# Patient Record
Sex: MALE | Race: BLACK OR AFRICAN AMERICAN | NOT HISPANIC OR LATINO | Employment: UNEMPLOYED | URBAN - METROPOLITAN AREA
[De-identification: names, ages, dates, MRNs, and addresses within clinical notes are randomized per-mention and may not be internally consistent; named-entity substitution may affect disease eponyms.]

---

## 2023-07-12 ENCOUNTER — OFFICE VISIT (OUTPATIENT)
Dept: PRIMARY CARE | Facility: CLINIC | Age: 48
End: 2023-07-12
Payer: COMMERCIAL

## 2023-07-12 ENCOUNTER — LAB (OUTPATIENT)
Dept: LAB | Facility: LAB | Age: 48
End: 2023-07-12
Payer: COMMERCIAL

## 2023-07-12 VITALS
HEART RATE: 79 BPM | SYSTOLIC BLOOD PRESSURE: 113 MMHG | OXYGEN SATURATION: 98 % | WEIGHT: 192 LBS | DIASTOLIC BLOOD PRESSURE: 76 MMHG | BODY MASS INDEX: 30.99 KG/M2

## 2023-07-12 DIAGNOSIS — E55.9 VITAMIN D DEFICIENCY: ICD-10-CM

## 2023-07-12 DIAGNOSIS — Z12.11 SCREENING FOR COLON CANCER: ICD-10-CM

## 2023-07-12 DIAGNOSIS — R94.4 DECREASED GFR: ICD-10-CM

## 2023-07-12 DIAGNOSIS — R79.89 ELEVATED LFTS: ICD-10-CM

## 2023-07-12 DIAGNOSIS — Z00.00 WELLNESS EXAMINATION: Primary | ICD-10-CM

## 2023-07-12 PROBLEM — H53.8 BLURRED VISION: Status: ACTIVE | Noted: 2023-07-12

## 2023-07-12 PROBLEM — J45.909 ASTHMA (HHS-HCC): Status: ACTIVE | Noted: 2023-07-12

## 2023-07-12 PROBLEM — E78.00 HYPERCHOLESTEROLEMIA WITHOUT HYPERTRIGLYCERIDEMIA: Status: ACTIVE | Noted: 2023-07-12

## 2023-07-12 PROBLEM — R30.0 DYSURIA: Status: ACTIVE | Noted: 2023-07-12

## 2023-07-12 PROBLEM — T78.40XA ALLERGIC REACTION: Status: ACTIVE | Noted: 2023-07-12

## 2023-07-12 PROBLEM — R09.89 TENDER LYMPH NODE: Status: ACTIVE | Noted: 2023-07-12

## 2023-07-12 PROBLEM — N48.89 PAIN, PENILE: Status: ACTIVE | Noted: 2023-07-12

## 2023-07-12 PROBLEM — R51.9 HEADACHE: Status: ACTIVE | Noted: 2023-07-12

## 2023-07-12 PROBLEM — H61.20 WAX IN EAR: Status: ACTIVE | Noted: 2023-07-12

## 2023-07-12 PROBLEM — K20.90 ESOPHAGITIS: Status: ACTIVE | Noted: 2023-07-12

## 2023-07-12 LAB
ALANINE AMINOTRANSFERASE (SGPT) (U/L) IN SER/PLAS: 44 U/L (ref 10–52)
ALBUMIN (G/DL) IN SER/PLAS: 4.5 G/DL (ref 3.4–5)
ALKALINE PHOSPHATASE (U/L) IN SER/PLAS: 45 U/L (ref 33–120)
ANION GAP IN SER/PLAS: 10 MMOL/L (ref 10–20)
ASPARTATE AMINOTRANSFERASE (SGOT) (U/L) IN SER/PLAS: 20 U/L (ref 9–39)
BILIRUBIN TOTAL (MG/DL) IN SER/PLAS: 0.5 MG/DL (ref 0–1.2)
CALCIDIOL (25 OH VITAMIN D3) (NG/ML) IN SER/PLAS: 21 NG/ML
CALCIUM (MG/DL) IN SER/PLAS: 9.4 MG/DL (ref 8.6–10.6)
CARBON DIOXIDE, TOTAL (MMOL/L) IN SER/PLAS: 28 MMOL/L (ref 21–32)
CHLORIDE (MMOL/L) IN SER/PLAS: 106 MMOL/L (ref 98–107)
CHOLESTEROL (MG/DL) IN SER/PLAS: 194 MG/DL (ref 0–199)
CHOLESTEROL IN HDL (MG/DL) IN SER/PLAS: 52.6 MG/DL
CHOLESTEROL/HDL RATIO: 3.7
CREATININE (MG/DL) IN SER/PLAS: 1.41 MG/DL (ref 0.5–1.3)
ERYTHROCYTE DISTRIBUTION WIDTH (RATIO) BY AUTOMATED COUNT: 12.4 % (ref 11.5–14.5)
ERYTHROCYTE MEAN CORPUSCULAR HEMOGLOBIN CONCENTRATION (G/DL) BY AUTOMATED: 33.4 G/DL (ref 32–36)
ERYTHROCYTE MEAN CORPUSCULAR VOLUME (FL) BY AUTOMATED COUNT: 91 FL (ref 80–100)
ERYTHROCYTES (10*6/UL) IN BLOOD BY AUTOMATED COUNT: 4.88 X10E12/L (ref 4.5–5.9)
GFR MALE: 62 ML/MIN/1.73M2
GLUCOSE (MG/DL) IN SER/PLAS: 78 MG/DL (ref 74–99)
HEMATOCRIT (%) IN BLOOD BY AUTOMATED COUNT: 44.3 % (ref 41–52)
HEMOGLOBIN (G/DL) IN BLOOD: 14.8 G/DL (ref 13.5–17.5)
LDL: 125 MG/DL (ref 0–99)
LEUKOCYTES (10*3/UL) IN BLOOD BY AUTOMATED COUNT: 7.7 X10E9/L (ref 4.4–11.3)
NRBC (PER 100 WBCS) BY AUTOMATED COUNT: 0 /100 WBC (ref 0–0)
PLATELETS (10*3/UL) IN BLOOD AUTOMATED COUNT: 281 X10E9/L (ref 150–450)
POTASSIUM (MMOL/L) IN SER/PLAS: 4.3 MMOL/L (ref 3.5–5.3)
PROTEIN TOTAL: 7.5 G/DL (ref 6.4–8.2)
SODIUM (MMOL/L) IN SER/PLAS: 140 MMOL/L (ref 136–145)
THYROTROPIN (MIU/L) IN SER/PLAS BY DETECTION LIMIT <= 0.05 MIU/L: 1.34 MIU/L (ref 0.44–3.98)
TRIGLYCERIDE (MG/DL) IN SER/PLAS: 84 MG/DL (ref 0–149)
UREA NITROGEN (MG/DL) IN SER/PLAS: 13 MG/DL (ref 6–23)
VLDL: 17 MG/DL (ref 0–40)

## 2023-07-12 PROCEDURE — 80053 COMPREHEN METABOLIC PANEL: CPT

## 2023-07-12 PROCEDURE — 1036F TOBACCO NON-USER: CPT | Performed by: STUDENT IN AN ORGANIZED HEALTH CARE EDUCATION/TRAINING PROGRAM

## 2023-07-12 PROCEDURE — 82306 VITAMIN D 25 HYDROXY: CPT

## 2023-07-12 PROCEDURE — 84443 ASSAY THYROID STIM HORMONE: CPT

## 2023-07-12 PROCEDURE — 36415 COLL VENOUS BLD VENIPUNCTURE: CPT

## 2023-07-12 PROCEDURE — 99396 PREV VISIT EST AGE 40-64: CPT | Performed by: STUDENT IN AN ORGANIZED HEALTH CARE EDUCATION/TRAINING PROGRAM

## 2023-07-12 PROCEDURE — 85027 COMPLETE CBC AUTOMATED: CPT

## 2023-07-12 PROCEDURE — 80061 LIPID PANEL: CPT

## 2023-07-12 RX ORDER — ALBUTEROL SULFATE 90 UG/1
1 AEROSOL, METERED RESPIRATORY (INHALATION) EVERY 4 HOURS PRN
COMMUNITY

## 2023-07-12 NOTE — PROGRESS NOTES
HPI: 47-year-old male presenting for annual exam.  New concerns.  Is planning to start exercise regimen soon, wants to focus on weight lifting.  Wants to see eye doctor, no current complaints, just wants checkup.    Intermittent asthma  Uses albuterol approximately twice a year.  Doing relatively well without    Fatty liver  Transient elevation in ALT, ultrasound showed mild fatty infiltration.    HLD  Elevated LDL at 154, corresponding ASCVD risk low.  Asymptomatic.    SocHx:   - Smoking: Never   - Alcohol: Rare   - Drug use: None    Denies HA, changes in vision, chest pain, SOB/SANDS, palpitations, N/V/D/C, dysuria/hematuria, hematochezia/melena, paresthesias, LE edema.    12 point ROS reviewed and negative other than as stated in HPI      General: Alert, oriented, pleasant, in no acute distress  HEENT:      Head: normocephalic, atraumatic;      eyes: EOMI, no scleral icterus;   Neck: soft, supple, non-tender, no masses appreciated  CV: Heart with regular rate and rhythm, normal S1/S2, no murmurs  Lungs: CTAB without wheezing, rhonchi or rales; good respiratory effort, no increased work of breathing  Abdomen: soft, non-tender, non-distended, no masses appreciated  Extremities: no edema, no cyanosis  MSK: ROM of upper and lower extremities intact; strength 5/5 upper and lower extremities  Neuro: Cranial nerves grossly intact; alert and oriented, normal gait  Psych: Appropriate mood and affect    1. HM  -CBC, CMP, Lipid panel, Vit D, TSH with reflex T4  -Vaccines:       Flu: out of season      Shingrix: at 50      Pneumococcal: Prevnar 20      Tdap: Likely 2017, will give in 20227  -Lung cancer screening with low-dose CT: never smoker  -Colonoscopy: Ordered  -AAA screening: Never smoker  -Ophthalmology referral at patient's request    2. Intermittent asthma  -Continue with albuterol as needed, using less than several times a year    3.  NAFLD  - Transient elevated ALT, ultrasound showed mild infiltration  - Continue  healthy diet, body fat reduction    4.  HLD  - LDL at 154, HDL 47.9, ASCVD risk at 3.8%  - Repeat lipid panel    F/U annually or sooner if indicated    Chris D'Amico, DO HPI:

## 2023-07-13 NOTE — RESULT ENCOUNTER NOTE
Borderline elevated LDL at 125, good HDL at 52.6, corresponding ASCVD risk is low at 3.6%, no medication indicated.  Continue to improve diet And exercise.    Chronic kidney disease double stage, slightly worsened over the last year or 2.  Unclear etiology.  We will continue to monitor.  Could consider Farxiga in the near future.  If showing any more decline, given young age, would consider nephrology referral.    Vitamin D mildly decreased at 21, would recommend OTC vitamin D3, 2000 units daily.    Remaining labs unremarkable.

## 2023-09-12 ENCOUNTER — TELEPHONE (OUTPATIENT)
Dept: PRIMARY CARE | Facility: CLINIC | Age: 48
End: 2023-09-12
Payer: COMMERCIAL

## 2023-11-01 ENCOUNTER — LAB (OUTPATIENT)
Dept: LAB | Facility: LAB | Age: 48
End: 2023-11-01
Payer: COMMERCIAL

## 2023-11-01 DIAGNOSIS — E78.00 ELEVATED LDL CHOLESTEROL LEVEL: ICD-10-CM

## 2023-11-01 DIAGNOSIS — E55.9 VITAMIN D DEFICIENCY: ICD-10-CM

## 2023-11-01 DIAGNOSIS — E55.9 VITAMIN D DEFICIENCY: Primary | ICD-10-CM

## 2023-11-01 PROCEDURE — 82306 VITAMIN D 25 HYDROXY: CPT

## 2023-11-01 PROCEDURE — 80061 LIPID PANEL: CPT

## 2023-11-01 PROCEDURE — 36415 COLL VENOUS BLD VENIPUNCTURE: CPT

## 2023-11-01 NOTE — LETTER
November 3, 2023     Mumtaz Gary  [address]  [place] [address]      Dear Mr. Vega:    Below are the results from your recent visit:        Vitamin D is slightly better at 29, would recommend taking daily OTC vitamin D3, 2000 units    Cholesterol actually worsened somewhat from 3 months ago, LDL at 153, ASCVD 10-year risk is still low at 4%, would continue to work on diet.  We can refer to nutritionist if patient is interested.  We generally recommend the Mediterranean diet.         If you have any questions or concerns, please don't hesitate to call.

## 2023-11-02 LAB
25(OH)D3 SERPL-MCNC: 29 NG/ML (ref 30–100)
CHOLEST SERPL-MCNC: 218 MG/DL (ref 0–199)
CHOLESTEROL/HDL RATIO: 4.3
HDLC SERPL-MCNC: 50.4 MG/DL
LDLC SERPL CALC-MCNC: 153 MG/DL
NON HDL CHOLESTEROL: 168 MG/DL (ref 0–149)
TRIGL SERPL-MCNC: 71 MG/DL (ref 0–149)
VLDL: 14 MG/DL (ref 0–40)

## 2023-11-02 NOTE — RESULT ENCOUNTER NOTE
Vitamin D is slightly better at 29, would recommend taking daily OTC vitamin D3, 2000 units    Cholesterol actually worsened somewhat from 3 months ago, LDL at 153, ASCVD 10-year risk is still low at 4%, would continue to work on diet.  We can refer to nutritionist if patient is interested.  We generally recommend the Mediterranean diet.

## 2023-11-07 DIAGNOSIS — E78.00 ELEVATED LDL CHOLESTEROL LEVEL: Primary | ICD-10-CM

## 2023-11-07 PROBLEM — D49.2 NEOPLASM OF UNSPECIFIED BEHAVIOR OF BONE, SOFT TISSUE, AND SKIN: Status: ACTIVE | Noted: 2018-07-05

## 2023-11-07 PROBLEM — B07.8 OTHER VIRAL WARTS: Status: ACTIVE | Noted: 2018-07-05

## 2023-11-07 PROBLEM — D04.61 CARCINOMA IN SITU OF SKIN OF RIGHT UPPER LIMB, INCLUDING SHOULDER: Status: ACTIVE | Noted: 2018-07-05

## 2023-11-07 PROBLEM — R00.2 PALPITATIONS: Status: ACTIVE | Noted: 2018-01-26

## 2023-11-07 PROBLEM — D22.5 MELANOCYTIC NEVI OF TRUNK: Status: ACTIVE | Noted: 2018-07-05

## 2023-11-07 PROBLEM — R69 DIAGNOSIS UNKNOWN: Status: ACTIVE | Noted: 2023-11-07

## 2023-11-07 PROBLEM — E66.3 OVERWEIGHT: Status: ACTIVE | Noted: 2018-01-26

## 2023-11-14 ENCOUNTER — APPOINTMENT (OUTPATIENT)
Dept: OPHTHALMOLOGY | Facility: CLINIC | Age: 48
End: 2023-11-14
Payer: COMMERCIAL

## 2024-04-14 ENCOUNTER — APPOINTMENT (OUTPATIENT)
Dept: RADIOLOGY | Facility: HOSPITAL | Age: 49
End: 2024-04-14
Payer: COMMERCIAL

## 2024-04-14 ENCOUNTER — APPOINTMENT (OUTPATIENT)
Dept: CARDIOLOGY | Facility: HOSPITAL | Age: 49
End: 2024-04-14
Payer: COMMERCIAL

## 2024-04-14 ENCOUNTER — HOSPITAL ENCOUNTER (EMERGENCY)
Facility: HOSPITAL | Age: 49
Discharge: HOME | End: 2024-04-14
Attending: STUDENT IN AN ORGANIZED HEALTH CARE EDUCATION/TRAINING PROGRAM
Payer: COMMERCIAL

## 2024-04-14 ENCOUNTER — ANESTHESIA EVENT (OUTPATIENT)
Dept: GASTROENTEROLOGY | Facility: HOSPITAL | Age: 49
End: 2024-04-14
Payer: COMMERCIAL

## 2024-04-14 VITALS
WEIGHT: 193 LBS | OXYGEN SATURATION: 96 % | HEIGHT: 67 IN | BODY MASS INDEX: 30.29 KG/M2 | RESPIRATION RATE: 18 BRPM | DIASTOLIC BLOOD PRESSURE: 87 MMHG | SYSTOLIC BLOOD PRESSURE: 132 MMHG | TEMPERATURE: 97.9 F | HEART RATE: 79 BPM

## 2024-04-14 DIAGNOSIS — T18.128A ESOPHAGEAL OBSTRUCTION DUE TO FOOD IMPACTION: Primary | ICD-10-CM
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
## 2024-04-14 DIAGNOSIS — R13.10 DYSPHAGIA, UNSPECIFIED TYPE: ICD-10-CM

## 2024-04-14 DIAGNOSIS — T18.128A FOOD IMPACTION OF ESOPHAGUS, INITIAL ENCOUNTER: ICD-10-CM

## 2024-04-14 LAB
ALBUMIN SERPL BCP-MCNC: 4.3 G/DL (ref 3.4–5)
ALP SERPL-CCNC: 43 U/L (ref 33–120)
ALT SERPL W P-5'-P-CCNC: 36 U/L (ref 10–52)
ANION GAP SERPL CALC-SCNC: 12 MMOL/L (ref 10–20)
AST SERPL W P-5'-P-CCNC: 21 U/L (ref 9–39)
BASOPHILS # BLD AUTO: 0.06 X10*3/UL (ref 0–0.1)
BASOPHILS NFR BLD AUTO: 0.9 %
BILIRUB SERPL-MCNC: 0.5 MG/DL (ref 0–1.2)
BUN SERPL-MCNC: 14 MG/DL (ref 6–23)
CALCIUM SERPL-MCNC: 8.9 MG/DL (ref 8.6–10.3)
CARDIAC TROPONIN I PNL SERPL HS: <3 NG/L (ref 0–20)
CHLORIDE SERPL-SCNC: 108 MMOL/L (ref 98–107)
CO2 SERPL-SCNC: 24 MMOL/L (ref 21–32)
CREAT SERPL-MCNC: 1.33 MG/DL (ref 0.5–1.3)
EGFRCR SERPLBLD CKD-EPI 2021: 66 ML/MIN/1.73M*2
EOSINOPHIL # BLD AUTO: 0.36 X10*3/UL (ref 0–0.7)
EOSINOPHIL NFR BLD AUTO: 5.3 %
ERYTHROCYTE [DISTWIDTH] IN BLOOD BY AUTOMATED COUNT: 12.3 % (ref 11.5–14.5)
GLUCOSE SERPL-MCNC: 88 MG/DL (ref 74–99)
HCT VFR BLD AUTO: 41.3 % (ref 41–52)
HGB BLD-MCNC: 14.2 G/DL (ref 13.5–17.5)
IMM GRANULOCYTES # BLD AUTO: 0.04 X10*3/UL (ref 0–0.7)
IMM GRANULOCYTES NFR BLD AUTO: 0.6 % (ref 0–0.9)
LYMPHOCYTES # BLD AUTO: 2.1 X10*3/UL (ref 1.2–4.8)
LYMPHOCYTES NFR BLD AUTO: 30.7 %
MCH RBC QN AUTO: 30.7 PG (ref 26–34)
MCHC RBC AUTO-ENTMCNC: 34.4 G/DL (ref 32–36)
MCV RBC AUTO: 89 FL (ref 80–100)
MONOCYTES # BLD AUTO: 0.48 X10*3/UL (ref 0.1–1)
MONOCYTES NFR BLD AUTO: 7 %
NEUTROPHILS # BLD AUTO: 3.79 X10*3/UL (ref 1.2–7.7)
NEUTROPHILS NFR BLD AUTO: 55.5 %
NRBC BLD-RTO: 0 /100 WBCS (ref 0–0)
PLATELET # BLD AUTO: 267 X10*3/UL (ref 150–450)
POTASSIUM SERPL-SCNC: 3.7 MMOL/L (ref 3.5–5.3)
PROT SERPL-MCNC: 7.1 G/DL (ref 6.4–8.2)
RBC # BLD AUTO: 4.62 X10*6/UL (ref 4.5–5.9)
SODIUM SERPL-SCNC: 140 MMOL/L (ref 136–145)
WBC # BLD AUTO: 6.8 X10*3/UL (ref 4.4–11.3)

## 2024-04-14 PROCEDURE — 71045 X-RAY EXAM CHEST 1 VIEW: CPT

## 2024-04-14 PROCEDURE — 36415 COLL VENOUS BLD VENIPUNCTURE: CPT | Performed by: STUDENT IN AN ORGANIZED HEALTH CARE EDUCATION/TRAINING PROGRAM

## 2024-04-14 PROCEDURE — 71045 X-RAY EXAM CHEST 1 VIEW: CPT | Performed by: RADIOLOGY

## 2024-04-14 PROCEDURE — 2500000004 HC RX 250 GENERAL PHARMACY W/ HCPCS (ALT 636 FOR OP/ED): Mod: JZ | Performed by: STUDENT IN AN ORGANIZED HEALTH CARE EDUCATION/TRAINING PROGRAM

## 2024-04-14 PROCEDURE — 93005 ELECTROCARDIOGRAM TRACING: CPT | Mod: 59

## 2024-04-14 PROCEDURE — 2500000004 HC RX 250 GENERAL PHARMACY W/ HCPCS (ALT 636 FOR OP/ED): Performed by: INTERNAL MEDICINE

## 2024-04-14 PROCEDURE — 3700000001 HC GENERAL ANESTHESIA TIME - INITIAL BASE CHARGE

## 2024-04-14 PROCEDURE — 99222 1ST HOSP IP/OBS MODERATE 55: CPT | Performed by: INTERNAL MEDICINE

## 2024-04-14 PROCEDURE — 3700000002 HC GENERAL ANESTHESIA TIME - EACH INCREMENTAL 1 MINUTE

## 2024-04-14 PROCEDURE — A43239 PR EDG TRANSORAL BIOPSY SINGLE/MULTIPLE: Performed by: ANESTHESIOLOGY

## 2024-04-14 PROCEDURE — 2500000005 HC RX 250 GENERAL PHARMACY W/O HCPCS

## 2024-04-14 PROCEDURE — 99285 EMERGENCY DEPT VISIT HI MDM: CPT | Mod: 25 | Performed by: STUDENT IN AN ORGANIZED HEALTH CARE EDUCATION/TRAINING PROGRAM

## 2024-04-14 PROCEDURE — 84484 ASSAY OF TROPONIN QUANT: CPT | Performed by: STUDENT IN AN ORGANIZED HEALTH CARE EDUCATION/TRAINING PROGRAM

## 2024-04-14 PROCEDURE — 85025 COMPLETE CBC W/AUTO DIFF WBC: CPT | Performed by: STUDENT IN AN ORGANIZED HEALTH CARE EDUCATION/TRAINING PROGRAM

## 2024-04-14 PROCEDURE — 96374 THER/PROPH/DIAG INJ IV PUSH: CPT | Mod: 59 | Performed by: STUDENT IN AN ORGANIZED HEALTH CARE EDUCATION/TRAINING PROGRAM

## 2024-04-14 PROCEDURE — A43239 PR EDG TRANSORAL BIOPSY SINGLE/MULTIPLE

## 2024-04-14 PROCEDURE — 99140 ANES COMP EMERGENCY COND: CPT | Performed by: ANESTHESIOLOGY

## 2024-04-14 PROCEDURE — 80053 COMPREHEN METABOLIC PANEL: CPT | Performed by: STUDENT IN AN ORGANIZED HEALTH CARE EDUCATION/TRAINING PROGRAM

## 2024-04-14 PROCEDURE — 2500000004 HC RX 250 GENERAL PHARMACY W/ HCPCS (ALT 636 FOR OP/ED)

## 2024-04-14 PROCEDURE — 43249 ESOPH EGD DILATION <30 MM: CPT

## 2024-04-14 RX ORDER — PROPOFOL 10 MG/ML
INJECTION, EMULSION INTRAVENOUS AS NEEDED
Status: DISCONTINUED | OUTPATIENT
Start: 2024-04-14 | End: 2024-04-14

## 2024-04-14 RX ORDER — LIDOCAINE HYDROCHLORIDE 10 MG/ML
0.1 INJECTION INFILTRATION; PERINEURAL ONCE
Status: DISCONTINUED | OUTPATIENT
Start: 2024-04-14 | End: 2024-04-14 | Stop reason: HOSPADM

## 2024-04-14 RX ORDER — SUCCINYLCHOLINE CHLORIDE 20 MG/ML
INJECTION INTRAMUSCULAR; INTRAVENOUS AS NEEDED
Status: DISCONTINUED | OUTPATIENT
Start: 2024-04-14 | End: 2024-04-14

## 2024-04-14 RX ORDER — ACETAMINOPHEN 325 MG/1
650 TABLET ORAL EVERY 4 HOURS PRN
Status: DISCONTINUED | OUTPATIENT
Start: 2024-04-14 | End: 2024-04-14 | Stop reason: HOSPADM

## 2024-04-14 RX ORDER — DROPERIDOL 2.5 MG/ML
0.62 INJECTION, SOLUTION INTRAMUSCULAR; INTRAVENOUS ONCE AS NEEDED
Status: DISCONTINUED | OUTPATIENT
Start: 2024-04-14 | End: 2024-04-14 | Stop reason: HOSPADM

## 2024-04-14 RX ORDER — ONDANSETRON HYDROCHLORIDE 2 MG/ML
INJECTION, SOLUTION INTRAVENOUS AS NEEDED
Status: DISCONTINUED | OUTPATIENT
Start: 2024-04-14 | End: 2024-04-14

## 2024-04-14 RX ORDER — IPRATROPIUM BROMIDE 0.5 MG/2.5ML
500 SOLUTION RESPIRATORY (INHALATION) ONCE
Status: DISCONTINUED | OUTPATIENT
Start: 2024-04-14 | End: 2024-04-14 | Stop reason: HOSPADM

## 2024-04-14 RX ORDER — ONDANSETRON HYDROCHLORIDE 2 MG/ML
4 INJECTION, SOLUTION INTRAVENOUS ONCE AS NEEDED
Status: DISCONTINUED | OUTPATIENT
Start: 2024-04-14 | End: 2024-04-14 | Stop reason: HOSPADM

## 2024-04-14 RX ORDER — FENTANYL CITRATE 50 UG/ML
INJECTION, SOLUTION INTRAMUSCULAR; INTRAVENOUS AS NEEDED
Status: DISCONTINUED | OUTPATIENT
Start: 2024-04-14 | End: 2024-04-14

## 2024-04-14 RX ORDER — ALBUTEROL SULFATE 0.83 MG/ML
2.5 SOLUTION RESPIRATORY (INHALATION) ONCE AS NEEDED
Status: DISCONTINUED | OUTPATIENT
Start: 2024-04-14 | End: 2024-04-14 | Stop reason: HOSPADM

## 2024-04-14 RX ORDER — SODIUM CHLORIDE, SODIUM LACTATE, POTASSIUM CHLORIDE, CALCIUM CHLORIDE 600; 310; 30; 20 MG/100ML; MG/100ML; MG/100ML; MG/100ML
20 INJECTION, SOLUTION INTRAVENOUS CONTINUOUS
Status: DISCONTINUED | OUTPATIENT
Start: 2024-04-14 | End: 2024-04-14 | Stop reason: HOSPADM

## 2024-04-14 RX ORDER — MIDAZOLAM HYDROCHLORIDE 1 MG/ML
INJECTION INTRAMUSCULAR; INTRAVENOUS AS NEEDED
Status: DISCONTINUED | OUTPATIENT
Start: 2024-04-14 | End: 2024-04-14

## 2024-04-14 RX ORDER — OXYCODONE HYDROCHLORIDE 5 MG/1
5 TABLET ORAL EVERY 4 HOURS PRN
Status: DISCONTINUED | OUTPATIENT
Start: 2024-04-14 | End: 2024-04-14 | Stop reason: HOSPADM

## 2024-04-14 RX ORDER — SODIUM CHLORIDE, SODIUM LACTATE, POTASSIUM CHLORIDE, CALCIUM CHLORIDE 600; 310; 30; 20 MG/100ML; MG/100ML; MG/100ML; MG/100ML
100 INJECTION, SOLUTION INTRAVENOUS CONTINUOUS
Status: DISCONTINUED | OUTPATIENT
Start: 2024-04-14 | End: 2024-04-14 | Stop reason: HOSPADM

## 2024-04-14 RX ORDER — LIDOCAINE HYDROCHLORIDE 20 MG/ML
INJECTION, SOLUTION EPIDURAL; INFILTRATION; INTRACAUDAL; PERINEURAL AS NEEDED
Status: DISCONTINUED | OUTPATIENT
Start: 2024-04-14 | End: 2024-04-14

## 2024-04-14 RX ADMIN — FENTANYL CITRATE 50 MCG: 50 INJECTION, SOLUTION INTRAMUSCULAR; INTRAVENOUS at 18:45

## 2024-04-14 RX ADMIN — GLUCAGON HYDROCHLORIDE 1 MG: 1 INJECTION, POWDER, FOR SOLUTION INTRAMUSCULAR; INTRAVENOUS; SUBCUTANEOUS at 11:30

## 2024-04-14 RX ADMIN — SUCCINYLCHOLINE CHLORIDE 160 MG: 20 INJECTION, SOLUTION INTRAMUSCULAR; INTRAVENOUS at 18:22

## 2024-04-14 RX ADMIN — SODIUM CHLORIDE, POTASSIUM CHLORIDE, SODIUM LACTATE AND CALCIUM CHLORIDE: 600; 310; 30; 20 INJECTION, SOLUTION INTRAVENOUS at 18:18

## 2024-04-14 RX ADMIN — FENTANYL CITRATE 100 MCG: 50 INJECTION, SOLUTION INTRAMUSCULAR; INTRAVENOUS at 18:22

## 2024-04-14 RX ADMIN — LIDOCAINE HYDROCHLORIDE 100 MG: 20 INJECTION, SOLUTION EPIDURAL; INFILTRATION; INTRACAUDAL; PERINEURAL at 18:22

## 2024-04-14 RX ADMIN — FENTANYL CITRATE 50 MCG: 50 INJECTION, SOLUTION INTRAMUSCULAR; INTRAVENOUS at 18:35

## 2024-04-14 RX ADMIN — MIDAZOLAM HYDROCHLORIDE 2 MG: 1 INJECTION INTRAMUSCULAR; INTRAVENOUS at 18:21

## 2024-04-14 RX ADMIN — DEXAMETHASONE SODIUM PHOSPHATE 8 MG: 4 INJECTION, SOLUTION INTRAMUSCULAR; INTRAVENOUS at 18:30

## 2024-04-14 RX ADMIN — PROPOFOL 200 MG: 10 INJECTION, EMULSION INTRAVENOUS at 18:22

## 2024-04-14 RX ADMIN — ONDANSETRON 4 MG: 2 INJECTION INTRAMUSCULAR; INTRAVENOUS at 18:50

## 2024-04-14 ASSESSMENT — COLUMBIA-SUICIDE SEVERITY RATING SCALE - C-SSRS
1. IN THE PAST MONTH, HAVE YOU WISHED YOU WERE DEAD OR WISHED YOU COULD GO TO SLEEP AND NOT WAKE UP?: NO
6. HAVE YOU EVER DONE ANYTHING, STARTED TO DO ANYTHING, OR PREPARED TO DO ANYTHING TO END YOUR LIFE?: NO
2. HAVE YOU ACTUALLY HAD ANY THOUGHTS OF KILLING YOURSELF?: NO

## 2024-04-14 ASSESSMENT — PAIN - FUNCTIONAL ASSESSMENT
PAIN_FUNCTIONAL_ASSESSMENT: 0-10
PAIN_FUNCTIONAL_ASSESSMENT: 0-10
PAIN_FUNCTIONAL_ASSESSMENT: UNABLE TO SELF-REPORT
PAIN_FUNCTIONAL_ASSESSMENT: 0-10

## 2024-04-14 ASSESSMENT — PAIN SCALES - GENERAL
PAINLEVEL_OUTOF10: 0 - NO PAIN

## 2024-04-14 ASSESSMENT — ENCOUNTER SYMPTOMS: VOMITING: 1

## 2024-04-14 NOTE — ANESTHESIA PROCEDURE NOTES
Airway  Date/Time: 4/14/2024 6:24 PM  Urgency: elective    Airway not difficult    Staffing  Performed: YASEMIN   Authorized by: Fuentes Smiley MD    Performed by: YASEMIN Pedro  Patient location during procedure: OR    Indications and Patient Condition  Indications for airway management: anesthesia  Spontaneous Ventilation: absent  Sedation level: deep  Preoxygenated: yes  Patient position: sniffing  Mask difficulty assessment: 1 - vent by mask  No planned trial extubation    Final Airway Details  Final airway type: endotracheal airway      Successful airway: ETT  Cuffed: yes   Successful intubation technique: direct laryngoscopy  Endotracheal tube insertion site: oral  Blade: Kelsey  Blade size: #4  ETT size (mm): 7.5  Cormack-Lehane Classification: grade I - full view of glottis  Placement verified by: chest auscultation and capnometry   Cuff volume (mL): 7  Measured from: lips  ETT to lips (cm): 22  Number of attempts at approach: 1

## 2024-04-14 NOTE — H&P (VIEW-ONLY)
Gastroenterology Consultation Note    ASSESSMENT and PLAN:     Mumtaz Vega is a 48 y.o. male with a past medical history of asthma who presented with food impaction.      Proceed with urgent EGD this evening to remove the food bolus. Assuming no issues or complications, the patient should be able to go home afterwards.     Cecilio Brown MD    HISTORY OF PRESENT ILLNESS:     History Of Present Illness:    Mumtaz Vega is a 48 y.o. male who was eating a corned beef sandwich when he felt something get stuck in his esophagus. He has been unable to tolerate any PO intake or his own secretions since that time. He presented to ED where he received glucagon without improvement. He has had issues with prior food impaction with EGD to disimpact in 2021 as noted below. No blood thinners.     Relevant endoscopic evaluation results were personally reviewed.  EGD (7/2021)  - Normal duodenal bulb, first portion of the duodenum,                          second portion of the duodenum and examined duodenum.                         - Normal stomach.                         - Food in the middle third of the esophagus and in the                          lower third of the esophagus. Removal was successful.    Review of systems:   Review of Systems   Gastrointestinal:  Positive for vomiting.   All other systems reviewed and are negative.     A 10+ point ROS was completed and otherwise negative except as noted and per HPI.    PAST HISTORIES:     Past Medical History:  Past Medical History:   Diagnosis Date    Personal history of diseases of the skin and subcutaneous tissue 09/19/2018    History of eczema       Past Surgical History:  No past surgical history on file.     Family History:  Family History   Problem Relation Name Age of Onset    COPD Mother      Diabetes Mother      Glaucoma Mother      Hypertension Mother          Social History:   reports that he has never smoked. He has never used smokeless tobacco. He reports that he  "does not drink alcohol. No history on file for drug use.    OBJECTIVE:     Last Recorded Vitals:  Vitals:    04/14/24 1245 04/14/24 1330 04/14/24 1530 04/14/24 1615   BP: (!) 129/96 121/84 (!) 127/91 129/87   Pulse: 74 68 68 68   Resp: 14 18 18 20   Temp:       SpO2: 97% 100% 98% 98%   Weight:   87.5 kg (193 lb)    Height:   1.689 m (5' 6.5\")        Physical Exam:  CONSTITUTIONAL: NAD, appears stated age  EYES: anicteric sclera, sclera clear  HEAD: normocephalic, atraumatic   NECK: supple   ABDOMEN: soft, NTND, no rebound or guarding   MUSCULOSKELETAL: no edema  SKIN: no jaundice   PSYCHIATRIC: AOx3, appropriate insight and judgement    Allergies:  Allergies   Allergen Reactions    Amoxicillin Unknown       Inpatient Medications:          Outpatient Medications:  Prior to Admission medications    Medication Sig Start Date End Date Taking? Authorizing Provider   albuterol 90 mcg/actuation inhaler Inhale 1 puff every 4 hours if needed.    Historical Provider, MD       LABS AND IMAGING:     Labs:  Results for orders placed or performed during the hospital encounter of 04/14/24 (from the past 24 hour(s))   CBC and Auto Differential   Result Value Ref Range    WBC 6.8 4.4 - 11.3 x10*3/uL    nRBC 0.0 0.0 - 0.0 /100 WBCs    RBC 4.62 4.50 - 5.90 x10*6/uL    Hemoglobin 14.2 13.5 - 17.5 g/dL    Hematocrit 41.3 41.0 - 52.0 %    MCV 89 80 - 100 fL    MCH 30.7 26.0 - 34.0 pg    MCHC 34.4 32.0 - 36.0 g/dL    RDW 12.3 11.5 - 14.5 %    Platelets 267 150 - 450 x10*3/uL    Neutrophils % 55.5 40.0 - 80.0 %    Immature Granulocytes %, Automated 0.6 0.0 - 0.9 %    Lymphocytes % 30.7 13.0 - 44.0 %    Monocytes % 7.0 2.0 - 10.0 %    Eosinophils % 5.3 0.0 - 6.0 %    Basophils % 0.9 0.0 - 2.0 %    Neutrophils Absolute 3.79 1.20 - 7.70 x10*3/uL    Immature Granulocytes Absolute, Automated 0.04 0.00 - 0.70 x10*3/uL    Lymphocytes Absolute 2.10 1.20 - 4.80 x10*3/uL    Monocytes Absolute 0.48 0.10 - 1.00 x10*3/uL    Eosinophils Absolute 0.36 " 0.00 - 0.70 x10*3/uL    Basophils Absolute 0.06 0.00 - 0.10 x10*3/uL   Comprehensive metabolic panel   Result Value Ref Range    Glucose 88 74 - 99 mg/dL    Sodium 140 136 - 145 mmol/L    Potassium 3.7 3.5 - 5.3 mmol/L    Chloride 108 (H) 98 - 107 mmol/L    Bicarbonate 24 21 - 32 mmol/L    Anion Gap 12 10 - 20 mmol/L    Urea Nitrogen 14 6 - 23 mg/dL    Creatinine 1.33 (H) 0.50 - 1.30 mg/dL    eGFR 66 >60 mL/min/1.73m*2    Calcium 8.9 8.6 - 10.3 mg/dL    Albumin 4.3 3.4 - 5.0 g/dL    Alkaline Phosphatase 43 33 - 120 U/L    Total Protein 7.1 6.4 - 8.2 g/dL    AST 21 9 - 39 U/L    Bilirubin, Total 0.5 0.0 - 1.2 mg/dL    ALT 36 10 - 52 U/L   Troponin I, High Sensitivity   Result Value Ref Range    Troponin I, High Sensitivity <3 0 - 20 ng/L        Relevant imaging results were personally reviewed.  CXR  No acute cardiopulmonary process is evident.

## 2024-04-14 NOTE — ANESTHESIA POSTPROCEDURE EVALUATION
Patient: Mumtaz Vega    Procedure Summary       Date: 04/14/24 Room / Location: [facility] [place]    Anesthesia Start: 1818 Anesthesia Stop: 1904    Procedure: EGD Diagnosis: Food impaction of esophagus, initial encounter    Scheduled Providers:  Responsible Provider: Fuentes Smiley MD    Anesthesia Type: general ASA Status: 2 - Emergent            Anesthesia Type: general    Vitals Value Taken Time   BP  04/14/24 1909   Temp  04/14/24 1909   Pulse  04/14/24 1909   Resp  04/14/24 1909   SpO2  04/14/24 1909       Anesthesia Post Evaluation    Patient location during evaluation: bedside  Patient participation: complete - patient participated  Level of consciousness: awake  Pain management: adequate  Airway patency: patent  Cardiovascular status: acceptable  Respiratory status: acceptable  Hydration status: acceptable  Postoperative Nausea and Vomiting: none        No notable events documented.

## 2024-04-14 NOTE — DISCHARGE INSTRUCTIONS

## 2024-04-14 NOTE — ED PROVIDER NOTES
EMERGENCY MEDICINE EVALUATION NOTE    History of Present Illness     Chief Complaint:   Chief Complaint   Patient presents with    Foreign Body     Food bolus       HPI: Mumtaz Vega is a 48 y.o. male with past medical history of eczema who presents with complaint of foreign body sensation.  Patient states he ate a corn beef sandwich yesterday evening.  He states he has had similar issues in the past and states shortly afterwards he felt like the food was stuck in his esophagus.  He states since this morning he has been unable to swallow his own saliva and secretions and with immediate spit up.  He also states he has tried soda as well as other beverages with immediate regurgitation.  He does admit some pain and discomfort in his upper throat.  He denies any fever, chills, abdominal pain, nausea or vomiting.  Patient states he has had issues like this in the past usually after [place] sandwiches and states he has followed up with gastroenterology and did receive an endoscopy several years ago for this issue.      Previous History     Past Medical History:   Diagnosis Date    Personal history of diseases of the skin and subcutaneous tissue 09/19/2018    History of eczema     No past surgical history on file.  Social History     Tobacco Use    Smoking status: Never    Smokeless tobacco: Never   Vaping Use    Vaping status: Never Used   Substance Use Topics    Alcohol use: Never     Family History   Problem Relation Name Age of Onset    COPD Mother      Diabetes Mother      Glaucoma Mother      Hypertension Mother       Allergies   Allergen Reactions    Amoxicillin Unknown     Current Outpatient Medications   Medication Instructions    albuterol 90 mcg/actuation inhaler 1 puff, inhalation, Every 4 hours PRN       Physical Exam     Appearance: Alert, oriented , cooperative,  in acute distress. Well nourished & well hydrated.     Skin: Intact,  dry skin, no lesions, rash, petechiae or purpura.      Eyes: PERRLA, EOMs  intact,  Conjunctiva pink with no redness or exudates. Cornea & anterior chamber are clear, Eyelids without lesions. No scleral icterus.      ENT: Hearing grossly intact. External auditory canals patent, tympanic membranes intact with visible landmarks. Nares patent, mucus membranes moist. Dentition without lesions. Pharynx clear, uvula midline.      Neck: Supple, without meningismus. Thyroid not palpable. Trachea at midline. No lymphadenopathy.     Pulmonary: Clear bilaterally with good chest wall excursion. No rales, rhonchi or wheezing. No accessory muscle use or stridor.     Cardiac: Normal S1, S2 without murmur, rub, gallop or extrasystole. No JVD, Carotids without bruits.     Abdomen: Soft, nontender, active bowel sounds.  No palpable organomegaly.  No rebound or guarding.  No CVA tenderness.     Genitourinary: Exam deferred.     Musculoskeletal: Full range of motion. no pain, edema, or deformity. Pulses full and equal. No cyanosis or clubbing.      Neurological:  Cranial nerves II through XII are grossly intact, finger-nose touch is normal, normal sensation, no weakness, no focal findings identified.     Psychiatric: Appropriate mood and affect.      Results     Labs Reviewed   COMPREHENSIVE METABOLIC PANEL - Abnormal       Result Value    Glucose 88      Sodium 140      Potassium 3.7      Chloride 108 (*)     Bicarbonate 24      Anion Gap 12      Urea Nitrogen 14      Creatinine 1.33 (*)     eGFR 66      Calcium 8.9      Albumin 4.3      Alkaline Phosphatase 43      Total Protein 7.1      AST 21      Bilirubin, Total 0.5      ALT 36     TROPONIN I, HIGH SENSITIVITY - Normal    Troponin I, High Sensitivity <3      Narrative:     Less than 99th percentile of normal range cutoff-  Female and children under 18 years old <14 ng/L; Male <21 ng/L: Negative  Repeat testing should be performed if clinically indicated.     Female and children under 18 years old 14-50 ng/L; Male 21-50 ng/L:  Consistent with possible  cardiac damage and possible increased clinical   risk. Serial measurements may help to assess extent of myocardial damage.     >50 ng/L: Consistent with cardiac damage, increased clinical risk and  myocardial infarction. Serial measurements may help assess extent of   myocardial damage.      NOTE: Children less than 1 year old may have higher baseline troponin   levels and results should be interpreted in conjunction with the overall   clinical context.     NOTE: Troponin I testing is performed using a different   testing methodology at [facility] than at other   [facility] hospitals. Direct result comparisons should only   be made within the same method.   CBC WITH AUTO DIFFERENTIAL    WBC 6.8      nRBC 0.0      RBC 4.62      Hemoglobin 14.2      Hematocrit 41.3      MCV 89      MCH 30.7      MCHC 34.4      RDW 12.3      Platelets 267      Neutrophils % 55.5      Immature Granulocytes %, Automated 0.6      Lymphocytes % 30.7      Monocytes % 7.0      Eosinophils % 5.3      Basophils % 0.9      Neutrophils Absolute 3.79      Immature Granulocytes Absolute, Automated 0.04      Lymphocytes Absolute 2.10      Monocytes Absolute 0.48      Eosinophils Absolute 0.36      Basophils Absolute 0.06       XR chest 1 view   Final Result   No acute cardiopulmonary process is evident.        MACRO:   None        Signed by: Arnaldo Willis 4/14/2024 12:36 PM   Dictation workstation:   [id number]            ED Course & Medical Decision Making     Medications   glucagon (Glucagen) injection 1 mg (1 mg intravenous Given 4/14/24 1130)     Diagnoses as of 04/14/24 1342   Esophageal obstruction due to food impaction     Heart Rate:  [73-84]   Temperature:  [37.1 °C (98.8 °F)]   Respirations:  [14-22]   BP: (129-135)/(75-96)   Pulse Ox:  [97 %-100 %]      Mumtaz Vega is a 48 y.o. male with past medical history of eczema who presents with complaint of foreign body sensation.  Patient hemodynamically stable and afebrile.  He  was unable to tolerate his own secretions at bedside.  Otherwise oropharynx is clear.  Most likely food bolus impaction from reported corn beef ingestion last night.  I did also evaluate for possible ACS with low suspicion as well as suspicion for pulmonary embolism or other acute intrathoracic pathology.  Chest x-ray was nonacute.  Baseline renal function noted.  No significant electrolyte abnormality and normal troponin noted.  No leukocytosis or anemia.  EKG was sinus rhythm and first-degree AV block with a rate of 70 bpm no additional new signs of acute ischemic change or arrhythmia.  I did try IV glucagon as well as a carbonated beverage and patient did not have any relief and still unable to tolerate his own secretions or any oral intake.  Given these findings I did consult the gastroenterology team Dr. Brown and patient will be taken for emergent endoscopy at this time.    Procedures   Procedures    Diagnosis     1. Food impaction of esophagus, initial encounter    2. Esophageal obstruction due to food impaction        Disposition     Taken for emergent endoscopy by gastroenterology.    ED Prescriptions    None            Donte Mortensen DO  04/14/24 6907

## 2024-04-14 NOTE — ED TRIAGE NOTES
"Ate sandwich last night, has felt like something has been stuck since. Patient states that he has \"had to have scopes in the past but typically it can work itsself out but today it feels like its messing with my breathing\". Patient is not in respiratory distress at this time, able to manage secretions.  "

## 2024-04-14 NOTE — SIGNIFICANT EVENT
EGD showed food impaction in the middle esophagus. This was removed in a piecemeal fashion with a cold biopsy forceps and then ultimately the remainder of the bolus was able to be pushed into the stomach. There was a severe esophageal stricture in the middle esophagus that required dilation to 16 mm for the scope to be able to traverse the entire esophagus. Patient should go home on PPI 40 mg daily. He should follow-up outpatient with GI in 3-4 weeks to discuss possible repeat dilation as he does appear to have EoE. Ok for soft diet. Would avoid meat and bread for next 2 weeks. Ok for discharge home from GI standpoint.    GI will sign-off.

## 2024-04-14 NOTE — ANESTHESIA PREPROCEDURE EVALUATION
Patient: Mumtaz Vega    Procedure Information       Date/Time: 04/15/24 0700    Procedure: EGD    Location: [facility]            Relevant Problems   Cardiac   (+) Hypercholesterolemia without hypertriglyceridemia      Pulmonary   (+) Asthma (HHS-HCC)      Liver   (+) Elevated LFTs      ID   (+) Other viral warts       Clinical information reviewed:   Tobacco  Allergies  Meds   Med Hx  Surg Hx   Fam Hx  Soc Hx        NPO Detail:  NPO/Void Status  Date of Last Liquid: 04/14/24  Time of Last Liquid: 0800  Date of Last Solid: 04/13/24  Time of Last Solid: 2335  Last Intake Type: Clear fluids  Time of Last Void: 1803         Physical Exam    Airway  Mallampati: I     Cardiovascular   Rhythm: regular  Rate: normal     Dental    Pulmonary   Breath sounds clear to auscultation     Abdominal            Anesthesia Plan    History of general anesthesia?: yes  History of complications of general anesthesia?: no    ASA 2 - emergent     general     intravenous induction   Anesthetic plan and risks discussed with patient.    Plan discussed with CAA.       No

## 2024-04-15 ENCOUNTER — ANESTHESIA (OUTPATIENT)
Dept: GASTROENTEROLOGY | Facility: HOSPITAL | Age: 49
End: 2024-04-15
Payer: COMMERCIAL

## 2024-04-15 ENCOUNTER — APPOINTMENT (OUTPATIENT)
Dept: GASTROENTEROLOGY | Facility: HOSPITAL | Age: 49
End: 2024-04-15
Payer: COMMERCIAL

## 2024-04-15 LAB
ATRIAL RATE: 78 BPM
P AXIS: 46 DEGREES
P OFFSET: 174 MS
P ONSET: 115 MS
PR INTERVAL: 218 MS
Q ONSET: 224 MS
QRS COUNT: 12 BEATS
QRS DURATION: 72 MS
QT INTERVAL: 380 MS
QTC CALCULATION(BAZETT): 433 MS
QTC FREDERICIA: 414 MS
R AXIS: 34 DEGREES
T AXIS: 26 DEGREES
T OFFSET: 414 MS
VENTRICULAR RATE: 78 BPM

## 2024-04-15 PROCEDURE — 3700000002 HC GENERAL ANESTHESIA TIME - EACH INCREMENTAL 1 MINUTE

## 2024-04-15 PROCEDURE — 2720000007 HC OR 272 NO HCPCS

## 2024-04-15 PROCEDURE — 3700000001 HC GENERAL ANESTHESIA TIME - INITIAL BASE CHARGE

## 2024-04-15 PROCEDURE — 7100000001 HC RECOVERY ROOM TIME - INITIAL BASE CHARGE

## 2024-04-15 PROCEDURE — 43249 ESOPH EGD DILATION <30 MM: CPT | Performed by: INTERNAL MEDICINE

## 2024-04-15 PROCEDURE — 43249 ESOPH EGD DILATION <30 MM: CPT

## 2024-04-15 PROCEDURE — 43247 EGD REMOVE FOREIGN BODY: CPT | Performed by: INTERNAL MEDICINE

## 2024-04-15 PROCEDURE — 7100000002 HC RECOVERY ROOM TIME - EACH INCREMENTAL 1 MINUTE

## 2024-04-15 PROCEDURE — 7100000010 HC PHASE TWO TIME - EACH INCREMENTAL 1 MINUTE

## 2024-04-15 PROCEDURE — C1726 CATH, BAL DIL, NON-VASCULAR: HCPCS

## 2024-04-15 PROCEDURE — 7100000009 HC PHASE TWO TIME - INITIAL BASE CHARGE

## 2024-05-29 ENCOUNTER — OFFICE VISIT (OUTPATIENT)
Dept: GASTROENTEROLOGY | Facility: CLINIC | Age: 49
End: 2024-05-29
Payer: COMMERCIAL

## 2024-05-29 VITALS — WEIGHT: 196 LBS | HEART RATE: 75 BPM | BODY MASS INDEX: 30.76 KG/M2 | HEIGHT: 67 IN | OXYGEN SATURATION: 98 %

## 2024-05-29 DIAGNOSIS — Z12.11 COLON CANCER SCREENING: ICD-10-CM

## 2024-05-29 DIAGNOSIS — K20.90 ESOPHAGITIS: Primary | ICD-10-CM

## 2024-05-29 PROCEDURE — 99214 OFFICE O/P EST MOD 30 MIN: CPT

## 2024-05-29 RX ORDER — POLYETHYLENE GLYCOL 3350, SODIUM SULFATE ANHYDROUS, SODIUM BICARBONATE, SODIUM CHLORIDE, POTASSIUM CHLORIDE 236; 22.74; 6.74; 5.86; 2.97 G/4L; G/4L; G/4L; G/4L; G/4L
POWDER, FOR SOLUTION ORAL
Qty: 4000 ML | Refills: 0 | Status: SHIPPED | OUTPATIENT
Start: 2024-05-29

## 2024-05-29 ASSESSMENT — ENCOUNTER SYMPTOMS
FATIGUE: 0
RECTAL PAIN: 0
SHORTNESS OF BREATH: 0
NAUSEA: 0
COUGH: 0
CONSTIPATION: 0
ABDOMINAL PAIN: 0
DIARRHEA: 0
FEVER: 0
TROUBLE SWALLOWING: 1
ABDOMINAL DISTENTION: 0
VOMITING: 0
ANAL BLEEDING: 0
CHILLS: 0
BLOOD IN STOOL: 0
APPETITE CHANGE: 0

## 2024-05-29 NOTE — ASSESSMENT & PLAN NOTE
Consider EOE  -Schedule EGD  -Antireflux regimen recommended  -Patient declines medication at this time    Follow-up 3 weeks postprocedure

## 2024-05-29 NOTE — PROGRESS NOTES
Subjective     History of Present Illness:   Mumtaz Vega is a 48 y.o. male with PMHx of HLD, esophagitis, asthma who presents to GI clinic for further evaluation EGD follow-up    Today, patient reports feeling well since EGD. Every time he eats corned beef it gets stuck.  Reports family history of dysphagia.  Has acid reflux and choking at night after eating late or pasta sauce.  States he does not chew his food well.  He does not want to take pills or medication.  States he belches and has lots of gas.  Denies any other GI complaints today.  Denies constipation, diarrhea, melena, hematochezia, unintentional weight loss    Denies ETOH, smoking, marijuana  Denies fxh GI cancer or IBD  Abdominal Surgeries: denies    Denies history of colonoscopy   Last EGD 4/2024 Dr. 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  Repeat EGD recommended to biopsy for EOE.      Past Medical History  As per HPI.     Social History  he  reports that he has never smoked. He has never used smokeless tobacco. He reports that he does not drink alcohol and does not use drugs.     Family History  his family history includes COPD in his mother; Diabetes in his mother; Glaucoma in his mother; Hypertension in his mother.     Review of Systems  Review of Systems   Constitutional:  Negative for appetite change, chills, fatigue and fever.   HENT:  Positive for trouble swallowing.    Respiratory:  Negative for cough and shortness of breath.    Gastrointestinal:  Negative for abdominal distention, abdominal pain, anal bleeding, blood in stool, constipation, diarrhea, nausea, rectal pain and vomiting.       Allergies  No Known Allergies    Medications  Current Outpatient Medications    Medication Instructions    albuterol 90 mcg/actuation inhaler 1 puff, inhalation, Every 4 hours PRN    polyethylene glycol-electrolytes (polyethylene glycol) 420 gram solution Drink 1/2 at 6 pm the night prior to your colonoscopy and the other 1/2 5 hours prior to your colonoscopy        Objective   Visit Vitals  Pulse 75      Physical Exam  Constitutional:       Appearance: Normal appearance. He is normal weight.   HENT:      Mouth/Throat:      Mouth: Mucous membranes are dry.      Pharynx: Oropharynx is clear.   Cardiovascular:      Rate and Rhythm: Normal rate and regular rhythm.   Pulmonary:      Effort: Pulmonary effort is normal.      Breath sounds: Normal breath sounds. No wheezing or rhonchi.   Abdominal:      General: Abdomen is flat. Bowel sounds are normal. There is no distension.      Palpations: Abdomen is soft. There is no hepatomegaly.      Tenderness: There is no abdominal tenderness. There is no guarding or rebound. Negative signs include Dunne's sign.      Hernia: No hernia is present.   Musculoskeletal:         General: Normal range of motion.   Skin:     General: Skin is warm and dry.   Neurological:      General: No focal deficit present.      Mental Status: He is alert and oriented to person, place, and time.   Psychiatric:         Mood and Affect: Mood normal.         Behavior: Behavior normal.           Lab Results   Component Value Date    WBC 6.8 04/14/2024    WBC 7.7 07/12/2023    WBC 5.5 12/01/2022    HGB 14.2 04/14/2024    HGB 14.8 07/12/2023    HGB 15.2 12/01/2022    HCT 41.3 04/14/2024    HCT 44.3 07/12/2023    HCT 45.7 12/01/2022     04/14/2024     07/12/2023     12/01/2022     Lab Results   Component Value Date     04/14/2024     07/12/2023     12/01/2022    K 3.7 04/14/2024    K 4.3 07/12/2023    K 3.6 12/01/2022     (H) 04/14/2024     07/12/2023     12/01/2022    CO2 24 04/14/2024    CO2 28 07/12/2023    CO2 30 12/01/2022     BUN 14 04/14/2024    BUN 13 07/12/2023    BUN 9 12/01/2022    CREATININE 1.33 (H) 04/14/2024    CREATININE 1.41 (H) 07/12/2023    CREATININE 1.45 (H) 12/01/2022    CALCIUM 8.9 04/14/2024    CALCIUM 9.4 07/12/2023    CALCIUM 9.2 12/01/2022    PROT 7.1 04/14/2024    PROT 7.5 07/12/2023    PROT 7.5 12/01/2022    BILITOT 0.5 04/14/2024    BILITOT 0.5 07/12/2023    BILITOT 0.4 12/01/2022    ALKPHOS 43 04/14/2024    ALKPHOS 45 07/12/2023    ALKPHOS 48 12/01/2022    ALT 36 04/14/2024    ALT 44 07/12/2023    ALT 37 12/01/2022    AST 21 04/14/2024    AST 20 07/12/2023    AST 21 12/01/2022    GLUCOSE 88 04/14/2024    GLUCOSE 78 07/12/2023    GLUCOSE 83 12/01/2022           Mumtaz Vega is a 48 y.o. male who presents to GI clinic for follow-up for esophagitis.    Colon cancer screening  -Schedule screening colonoscopy    Esophagitis  Consider EOE  -Schedule EGD  -Antireflux regimen recommended  -Patient declines medication at this time    Follow-up 3 weeks postprocedure         Carline Merritt, APRN-CNP

## 2024-05-29 NOTE — PATIENT INSTRUCTIONS
Please schedule your upper endoscopy and colonoscopy. You will need a ride since this involves sedation.  I will send a bowel prep to your pharmacy.  Clear liquid diet the day before the procedure. Start the 1st part of the prep at 6 pm the night prior and the other half 5 hrs before the procedure.  Please follow up 3 weeks post procedure     Try to minimize acidic foods such as citrus fruits, tomatoes, and pop. Also try to avoid chocolate, peppermint, alcohol, and caffeine.  Avoid eating within 2-3 hours of lying down.   Eat more frequent smaller meals instead of a few large meals.  You can prop the head of your bed up when you are sleeping to decrease reflux.    We are going to assess for eosinophilic esophagitis

## 2024-07-10 ENCOUNTER — LAB (OUTPATIENT)
Dept: LAB | Facility: LAB | Age: 49
End: 2024-07-10
Payer: COMMERCIAL

## 2024-07-10 ENCOUNTER — APPOINTMENT (OUTPATIENT)
Dept: PRIMARY CARE | Facility: CLINIC | Age: 49
End: 2024-07-10
Payer: COMMERCIAL

## 2024-08-06 ENCOUNTER — DOCUMENTATION (OUTPATIENT)
Dept: GASTROENTEROLOGY | Facility: CLINIC | Age: 49
End: 2024-08-06

## 2024-08-06 ENCOUNTER — APPOINTMENT (OUTPATIENT)
Dept: GASTROENTEROLOGY | Facility: EXTERNAL LOCATION | Age: 49
End: 2024-08-06
Payer: COMMERCIAL

## 2025-08-09 ENCOUNTER — HOSPITAL ENCOUNTER (EMERGENCY)
Facility: HOSPITAL | Age: 50
Discharge: HOME | End: 2025-08-09
Payer: COMMERCIAL

## 2025-08-09 VITALS
HEIGHT: 67 IN | RESPIRATION RATE: 18 BRPM | OXYGEN SATURATION: 99 % | SYSTOLIC BLOOD PRESSURE: 130 MMHG | DIASTOLIC BLOOD PRESSURE: 82 MMHG | BODY MASS INDEX: 31.39 KG/M2 | TEMPERATURE: 97.9 F | WEIGHT: 200 LBS | HEART RATE: 74 BPM

## 2025-08-09 DIAGNOSIS — Z48.02 ENCOUNTER FOR REMOVAL OF SUTURES: Primary | ICD-10-CM

## 2025-08-09 PROCEDURE — 99282 EMERGENCY DEPT VISIT SF MDM: CPT

## 2025-08-09 RX ORDER — BACITRACIN ZINC 500 UNIT/G
OINTMENT (GRAM) TOPICAL EVERY 12 HOURS PRN
Qty: 14 G | Refills: 0 | Status: SHIPPED | OUTPATIENT
Start: 2025-08-09

## 2025-08-09 ASSESSMENT — PAIN SCALES - GENERAL: PAINLEVEL_OUTOF10: 0 - NO PAIN

## 2025-08-09 ASSESSMENT — PAIN - FUNCTIONAL ASSESSMENT: PAIN_FUNCTIONAL_ASSESSMENT: 0-10

## 2025-08-09 NOTE — DISCHARGE INSTRUCTIONS
Please use Q-tip to apply topical antibiotic ointment 1-2 times per day.  Keep wound clean and dry.  Do not apply hydrogen peroxide or alcohol over the wound.  Return to ER for any new or worsening symptoms

## 2025-08-09 NOTE — ED TRIAGE NOTES
Sutures in right thigh x16 days placed in [place]. Looking to have sutures removed. No drainage or other signs of infection reported
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
